# Patient Record
Sex: FEMALE | Race: BLACK OR AFRICAN AMERICAN | NOT HISPANIC OR LATINO | Employment: UNEMPLOYED | URBAN - METROPOLITAN AREA
[De-identification: names, ages, dates, MRNs, and addresses within clinical notes are randomized per-mention and may not be internally consistent; named-entity substitution may affect disease eponyms.]

---

## 2017-04-27 ENCOUNTER — OFFICE VISIT - HEALTHEAST (OUTPATIENT)
Dept: FAMILY MEDICINE | Facility: CLINIC | Age: 37
End: 2017-04-27

## 2017-04-27 ENCOUNTER — COMMUNICATION - HEALTHEAST (OUTPATIENT)
Dept: FAMILY MEDICINE | Facility: CLINIC | Age: 37
End: 2017-04-27

## 2017-04-27 DIAGNOSIS — F17.200 NICOTINE DEPENDENCE: ICD-10-CM

## 2017-04-27 DIAGNOSIS — Z11.3 SCREEN FOR STD (SEXUALLY TRANSMITTED DISEASE): ICD-10-CM

## 2017-04-27 DIAGNOSIS — F41.9 ANXIETY: ICD-10-CM

## 2017-04-27 DIAGNOSIS — N92.6 IRREGULAR MENSES: ICD-10-CM

## 2017-04-27 DIAGNOSIS — G47.30 SLEEP APNEA: ICD-10-CM

## 2017-04-27 DIAGNOSIS — J45.20 INTERMITTENT ASTHMA: ICD-10-CM

## 2017-04-27 DIAGNOSIS — E66.811 OBESITY (BMI 30.0-34.9): ICD-10-CM

## 2017-04-27 DIAGNOSIS — Z00.00 ANNUAL PHYSICAL EXAM: ICD-10-CM

## 2017-04-27 DIAGNOSIS — F32.9 MAJOR DEPRESSION: ICD-10-CM

## 2017-04-27 DIAGNOSIS — F12.90 MARIJUANA USE: ICD-10-CM

## 2017-04-27 LAB
HBA1C MFR BLD: 5.4 % (ref 3.5–6)
HIV 1+2 AB+HIV1 P24 AG SERPL QL IA: NEGATIVE
LDLC SERPL CALC-MCNC: 121 MG/DL

## 2017-04-27 ASSESSMENT — MIFFLIN-ST. JEOR: SCORE: 1481.09

## 2017-04-28 LAB — SYPHILIS RPR SCREEN - HISTORICAL: NORMAL

## 2017-05-02 LAB
HPV INTERPRETATION - HISTORICAL: NORMAL
HPV INTERPRETER - HISTORICAL: NORMAL

## 2017-05-04 LAB
BKR LAB AP ABNORMAL BLEEDING: YES
BKR LAB AP BIRTH CONTROL/HORMONES: NORMAL
BKR LAB AP CERVICAL APPEARANCE: NORMAL
BKR LAB AP GYN ADEQUACY: NORMAL
BKR LAB AP GYN INTERPRETATION: NORMAL
BKR LAB AP HPV REFLEX: NORMAL
BKR LAB AP LMP: NORMAL
BKR LAB AP PATIENT STATUS: NORMAL
BKR LAB AP PREVIOUS ABNORMAL: NORMAL
BKR LAB AP PREVIOUS NORMAL: NORMAL
HIGH RISK?: NO
PATH REPORT.COMMENTS IMP SPEC: NORMAL
RESULT FLAG (HE HISTORICAL CONVERSION): NORMAL

## 2017-05-23 ENCOUNTER — RECORDS - HEALTHEAST (OUTPATIENT)
Dept: ADMINISTRATIVE | Facility: OTHER | Age: 37
End: 2017-05-23

## 2017-06-07 ENCOUNTER — RECORDS - HEALTHEAST (OUTPATIENT)
Dept: ADMINISTRATIVE | Facility: OTHER | Age: 37
End: 2017-06-07

## 2017-06-13 ENCOUNTER — RECORDS - HEALTHEAST (OUTPATIENT)
Dept: ADMINISTRATIVE | Facility: OTHER | Age: 37
End: 2017-06-13

## 2017-06-14 ENCOUNTER — RECORDS - HEALTHEAST (OUTPATIENT)
Dept: ADMINISTRATIVE | Facility: OTHER | Age: 37
End: 2017-06-14

## 2017-06-15 ENCOUNTER — OFFICE VISIT - HEALTHEAST (OUTPATIENT)
Dept: FAMILY MEDICINE | Facility: CLINIC | Age: 37
End: 2017-06-15

## 2017-06-15 DIAGNOSIS — R10.2 PELVIC PAIN: ICD-10-CM

## 2017-06-15 DIAGNOSIS — N92.6 ABNORMAL MENSES: ICD-10-CM

## 2017-06-15 ASSESSMENT — MIFFLIN-ST. JEOR: SCORE: 1453.33

## 2017-06-20 ENCOUNTER — RECORDS - HEALTHEAST (OUTPATIENT)
Dept: ADMINISTRATIVE | Facility: OTHER | Age: 37
End: 2017-06-20

## 2017-06-21 ENCOUNTER — RECORDS - HEALTHEAST (OUTPATIENT)
Dept: ADMINISTRATIVE | Facility: OTHER | Age: 37
End: 2017-06-21

## 2017-06-22 ENCOUNTER — COMMUNICATION - HEALTHEAST (OUTPATIENT)
Dept: FAMILY MEDICINE | Facility: CLINIC | Age: 37
End: 2017-06-22

## 2017-06-22 ENCOUNTER — AMBULATORY - HEALTHEAST (OUTPATIENT)
Dept: FAMILY MEDICINE | Facility: CLINIC | Age: 37
End: 2017-06-22

## 2017-06-22 ENCOUNTER — HOSPITAL ENCOUNTER (OUTPATIENT)
Dept: ULTRASOUND IMAGING | Facility: CLINIC | Age: 37
Discharge: HOME | End: 2017-06-22
Attending: FAMILY MEDICINE

## 2017-06-22 DIAGNOSIS — R10.2 PELVIC PAIN: ICD-10-CM

## 2017-06-27 ENCOUNTER — RECORDS - HEALTHEAST (OUTPATIENT)
Dept: ADMINISTRATIVE | Facility: OTHER | Age: 37
End: 2017-06-27

## 2017-06-30 ENCOUNTER — HOSPITAL ENCOUNTER (OUTPATIENT)
Dept: RADIOLOGY | Facility: CLINIC | Age: 37
Discharge: HOME | End: 2017-06-30
Attending: FAMILY MEDICINE

## 2017-06-30 DIAGNOSIS — R10.2 PELVIC PAIN: ICD-10-CM

## 2017-07-06 ENCOUNTER — COMMUNICATION - HEALTHEAST (OUTPATIENT)
Dept: FAMILY MEDICINE | Facility: CLINIC | Age: 37
End: 2017-07-06

## 2017-07-06 DIAGNOSIS — Z98.51 S/P TUBAL LIGATION: ICD-10-CM

## 2017-07-06 DIAGNOSIS — Z31.9 PATIENT DESIRES PREGNANCY: ICD-10-CM

## 2017-07-07 ENCOUNTER — RECORDS - HEALTHEAST (OUTPATIENT)
Dept: ADMINISTRATIVE | Facility: OTHER | Age: 37
End: 2017-07-07

## 2017-08-12 ENCOUNTER — RECORDS - HEALTHEAST (OUTPATIENT)
Dept: ADMINISTRATIVE | Facility: OTHER | Age: 37
End: 2017-08-12

## 2017-08-15 ENCOUNTER — RECORDS - HEALTHEAST (OUTPATIENT)
Dept: ADMINISTRATIVE | Facility: OTHER | Age: 37
End: 2017-08-15

## 2017-08-21 ENCOUNTER — RECORDS - HEALTHEAST (OUTPATIENT)
Dept: ADMINISTRATIVE | Facility: OTHER | Age: 37
End: 2017-08-21

## 2017-08-23 ENCOUNTER — RECORDS - HEALTHEAST (OUTPATIENT)
Dept: ADMINISTRATIVE | Facility: OTHER | Age: 37
End: 2017-08-23

## 2017-09-01 ENCOUNTER — RECORDS - HEALTHEAST (OUTPATIENT)
Dept: ADMINISTRATIVE | Facility: OTHER | Age: 37
End: 2017-09-01

## 2017-09-06 ENCOUNTER — RECORDS - HEALTHEAST (OUTPATIENT)
Dept: ADMINISTRATIVE | Facility: OTHER | Age: 37
End: 2017-09-06

## 2017-09-07 ENCOUNTER — COMMUNICATION - HEALTHEAST (OUTPATIENT)
Dept: FAMILY MEDICINE | Facility: CLINIC | Age: 37
End: 2017-09-07

## 2017-09-14 ENCOUNTER — RECORDS - HEALTHEAST (OUTPATIENT)
Dept: ADMINISTRATIVE | Facility: OTHER | Age: 37
End: 2017-09-14

## 2017-09-15 ENCOUNTER — RECORDS - HEALTHEAST (OUTPATIENT)
Dept: ADMINISTRATIVE | Facility: OTHER | Age: 37
End: 2017-09-15

## 2017-09-18 ENCOUNTER — COMMUNICATION - HEALTHEAST (OUTPATIENT)
Dept: FAMILY MEDICINE | Facility: CLINIC | Age: 37
End: 2017-09-18

## 2017-09-21 ENCOUNTER — RECORDS - HEALTHEAST (OUTPATIENT)
Dept: ADMINISTRATIVE | Facility: OTHER | Age: 37
End: 2017-09-21

## 2017-09-21 ENCOUNTER — OFFICE VISIT - HEALTHEAST (OUTPATIENT)
Dept: FAMILY MEDICINE | Facility: CLINIC | Age: 37
End: 2017-09-21

## 2017-09-21 ENCOUNTER — RECORDS - HEALTHEAST (OUTPATIENT)
Dept: GENERAL RADIOLOGY | Facility: CLINIC | Age: 37
End: 2017-09-21

## 2017-09-21 DIAGNOSIS — J45.20 INTERMITTENT ASTHMA, UNCOMPLICATED: ICD-10-CM

## 2017-09-21 DIAGNOSIS — M79.672 LEFT FOOT PAIN: ICD-10-CM

## 2017-09-21 DIAGNOSIS — F41.9 ANXIETY: ICD-10-CM

## 2017-09-21 DIAGNOSIS — M79.672 PAIN IN LEFT FOOT: ICD-10-CM

## 2017-09-21 DIAGNOSIS — F32.9 MAJOR DEPRESSION: ICD-10-CM

## 2017-09-21 RX ORDER — ALBUTEROL SULFATE 90 UG/1
2 AEROSOL, METERED RESPIRATORY (INHALATION) EVERY 4 HOURS PRN
Qty: 1 INHALER | Refills: 1 | Status: SHIPPED | OUTPATIENT
Start: 2017-09-21

## 2017-09-22 ENCOUNTER — COMMUNICATION - HEALTHEAST (OUTPATIENT)
Dept: FAMILY MEDICINE | Facility: CLINIC | Age: 37
End: 2017-09-22

## 2017-09-26 ENCOUNTER — COMMUNICATION - HEALTHEAST (OUTPATIENT)
Dept: FAMILY MEDICINE | Facility: CLINIC | Age: 37
End: 2017-09-26

## 2017-09-26 DIAGNOSIS — M79.673 PAIN OF FOOT, UNSPECIFIED LATERALITY: ICD-10-CM

## 2017-09-28 ENCOUNTER — COMMUNICATION - HEALTHEAST (OUTPATIENT)
Dept: NURSING | Facility: CLINIC | Age: 37
End: 2017-09-28

## 2017-09-28 ENCOUNTER — OFFICE VISIT - HEALTHEAST (OUTPATIENT)
Dept: FAMILY MEDICINE | Facility: CLINIC | Age: 37
End: 2017-09-28

## 2017-09-28 DIAGNOSIS — M79.672 LEFT FOOT PAIN: ICD-10-CM

## 2017-09-28 DIAGNOSIS — Z71.89 COUNSELING AND COORDINATION OF CARE: ICD-10-CM

## 2017-10-09 ENCOUNTER — HOSPITAL ENCOUNTER (OUTPATIENT)
Dept: MRI IMAGING | Facility: CLINIC | Age: 37
Discharge: HOME | End: 2017-10-09
Attending: FAMILY MEDICINE

## 2017-10-09 DIAGNOSIS — M79.672 LEFT FOOT PAIN: ICD-10-CM

## 2017-10-11 ENCOUNTER — COMMUNICATION - HEALTHEAST (OUTPATIENT)
Dept: NURSING | Facility: CLINIC | Age: 37
End: 2017-10-11

## 2017-10-11 ENCOUNTER — COMMUNICATION - HEALTHEAST (OUTPATIENT)
Dept: CARE COORDINATION | Facility: CLINIC | Age: 37
End: 2017-10-11

## 2017-10-12 ENCOUNTER — COMMUNICATION - HEALTHEAST (OUTPATIENT)
Dept: FAMILY MEDICINE | Facility: CLINIC | Age: 37
End: 2017-10-12

## 2017-10-12 DIAGNOSIS — M79.672 LEFT FOOT PAIN: ICD-10-CM

## 2017-10-17 ENCOUNTER — COMMUNICATION - HEALTHEAST (OUTPATIENT)
Dept: CARE COORDINATION | Facility: CLINIC | Age: 37
End: 2017-10-17

## 2017-10-19 ENCOUNTER — COMMUNICATION - HEALTHEAST (OUTPATIENT)
Dept: FAMILY MEDICINE | Facility: CLINIC | Age: 37
End: 2017-10-19

## 2017-10-19 DIAGNOSIS — F41.9 ANXIETY: ICD-10-CM

## 2017-10-19 DIAGNOSIS — F32.9 MAJOR DEPRESSION: ICD-10-CM

## 2017-10-23 ENCOUNTER — COMMUNICATION - HEALTHEAST (OUTPATIENT)
Dept: NURSING | Facility: CLINIC | Age: 37
End: 2017-10-23

## 2017-10-31 ENCOUNTER — COMMUNICATION - HEALTHEAST (OUTPATIENT)
Dept: FAMILY MEDICINE | Facility: CLINIC | Age: 37
End: 2017-10-31

## 2017-10-31 ENCOUNTER — COMMUNICATION - HEALTHEAST (OUTPATIENT)
Dept: CARE COORDINATION | Facility: CLINIC | Age: 37
End: 2017-10-31

## 2017-10-31 DIAGNOSIS — F41.9 ANXIETY: ICD-10-CM

## 2017-11-10 ENCOUNTER — COMMUNICATION - HEALTHEAST (OUTPATIENT)
Dept: NURSING | Facility: CLINIC | Age: 37
End: 2017-11-10

## 2017-11-16 ENCOUNTER — COMMUNICATION - HEALTHEAST (OUTPATIENT)
Dept: NURSING | Facility: CLINIC | Age: 37
End: 2017-11-16

## 2017-11-22 ENCOUNTER — COMMUNICATION - HEALTHEAST (OUTPATIENT)
Dept: NURSING | Facility: CLINIC | Age: 37
End: 2017-11-22

## 2018-02-28 ENCOUNTER — COMMUNICATION - HEALTHEAST (OUTPATIENT)
Dept: FAMILY MEDICINE | Facility: CLINIC | Age: 38
End: 2018-02-28

## 2018-03-05 ENCOUNTER — RECORDS - HEALTHEAST (OUTPATIENT)
Dept: GENERAL RADIOLOGY | Facility: CLINIC | Age: 38
End: 2018-03-05

## 2018-03-05 ENCOUNTER — COMMUNICATION - HEALTHEAST (OUTPATIENT)
Dept: NURSING | Facility: CLINIC | Age: 38
End: 2018-03-05

## 2018-03-05 ENCOUNTER — OFFICE VISIT - HEALTHEAST (OUTPATIENT)
Dept: FAMILY MEDICINE | Facility: CLINIC | Age: 38
End: 2018-03-05

## 2018-03-05 DIAGNOSIS — F17.200 NICOTINE DEPENDENCE: ICD-10-CM

## 2018-03-05 DIAGNOSIS — M79.672 LEFT FOOT PAIN: ICD-10-CM

## 2018-03-05 DIAGNOSIS — J45.20 MILD INTERMITTENT ASTHMA WITHOUT COMPLICATION: ICD-10-CM

## 2018-03-05 DIAGNOSIS — R35.89 POLYURIA: ICD-10-CM

## 2018-03-05 DIAGNOSIS — F31.32 BIPOLAR AFFECTIVE DISORDER, CURRENTLY DEPRESSED, MODERATE (H): ICD-10-CM

## 2018-03-05 DIAGNOSIS — H40.9 GLAUCOMA: ICD-10-CM

## 2018-03-05 DIAGNOSIS — S49.91XA ARM INJURY, RIGHT, INITIAL ENCOUNTER: ICD-10-CM
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
## 2018-03-05 DIAGNOSIS — F41.9 ANXIETY: ICD-10-CM

## 2018-03-05 DIAGNOSIS — K59.04 CHRONIC IDIOPATHIC CONSTIPATION: ICD-10-CM

## 2018-03-05 DIAGNOSIS — F32.9 MAJOR DEPRESSION: ICD-10-CM
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
## 2018-03-05 DIAGNOSIS — S59.901A UNSPECIFIED INJURY OF RIGHT ELBOW, INITIAL ENCOUNTER: ICD-10-CM

## 2018-03-05 LAB
ALBUMIN UR-MCNC: NEGATIVE MG/DL
APPEARANCE UR: CLEAR
BACTERIA #/AREA URNS HPF: ABNORMAL HPF
BILIRUB UR QL STRIP: NEGATIVE
COLOR UR AUTO: YELLOW
GLUCOSE UR STRIP-MCNC: NEGATIVE MG/DL
HGB UR QL STRIP: ABNORMAL
KETONES UR STRIP-MCNC: NEGATIVE MG/DL
LEUKOCYTE ESTERASE UR QL STRIP: NEGATIVE
NITRATE UR QL: NEGATIVE
PH UR STRIP: 7.5 [PH] (ref 5–8)
RBC #/AREA URNS AUTO: ABNORMAL HPF
SP GR UR STRIP: 1.02 (ref 1–1.03)
SQUAMOUS #/AREA URNS AUTO: ABNORMAL LPF
UROBILINOGEN UR STRIP-ACNC: ABNORMAL
WBC #/AREA URNS AUTO: ABNORMAL HPF

## 2018-03-05 RX ORDER — RISPERIDONE 1 MG/1
TABLET ORAL
Qty: 45 TABLET | Refills: 0 | Status: SHIPPED | OUTPATIENT
Start: 2018-03-05

## 2018-03-05 RX ORDER — RISPERIDONE 0.25 MG/1
TABLET ORAL
Qty: 30 TABLET | Refills: 0 | Status: SHIPPED | OUTPATIENT
Start: 2018-03-05

## 2018-03-05 ASSESSMENT — MIFFLIN-ST. JEOR: SCORE: 1521.37

## 2018-03-06 ENCOUNTER — AMBULATORY - HEALTHEAST (OUTPATIENT)
Dept: NURSING | Facility: CLINIC | Age: 38
End: 2018-03-06

## 2018-03-06 ENCOUNTER — COMMUNICATION - HEALTHEAST (OUTPATIENT)
Dept: CARE COORDINATION | Facility: CLINIC | Age: 38
End: 2018-03-06

## 2018-03-07 ENCOUNTER — COMMUNICATION - HEALTHEAST (OUTPATIENT)
Dept: FAMILY MEDICINE | Facility: CLINIC | Age: 38
End: 2018-03-07

## 2018-03-23 ENCOUNTER — COMMUNICATION - HEALTHEAST (OUTPATIENT)
Dept: SCHEDULING | Facility: CLINIC | Age: 38
End: 2018-03-23

## 2018-04-11 ENCOUNTER — OFFICE VISIT - HEALTHEAST (OUTPATIENT)
Dept: BEHAVIORAL HEALTH | Facility: CLINIC | Age: 38
End: 2018-04-11

## 2018-04-11 DIAGNOSIS — F33.2 SEVERE EPISODE OF RECURRENT MAJOR DEPRESSIVE DISORDER, WITHOUT PSYCHOTIC FEATURES (H): ICD-10-CM

## 2018-04-11 DIAGNOSIS — F41.1 GENERALIZED ANXIETY DISORDER: ICD-10-CM

## 2018-04-12 ENCOUNTER — AMBULATORY - HEALTHEAST (OUTPATIENT)
Dept: FAMILY MEDICINE | Facility: CLINIC | Age: 38
End: 2018-04-12

## 2018-04-12 DIAGNOSIS — F31.32 BIPOLAR AFFECTIVE DISORDER, CURRENTLY DEPRESSED, MODERATE (H): ICD-10-CM

## 2018-04-24 ENCOUNTER — COMMUNICATION - HEALTHEAST (OUTPATIENT)
Dept: NURSING | Facility: CLINIC | Age: 38
End: 2018-04-24

## 2018-05-04 ENCOUNTER — COMMUNICATION - HEALTHEAST (OUTPATIENT)
Dept: NURSING | Facility: CLINIC | Age: 38
End: 2018-05-04

## 2018-05-21 ENCOUNTER — COMMUNICATION - HEALTHEAST (OUTPATIENT)
Dept: BEHAVIORAL HEALTH | Facility: CLINIC | Age: 38
End: 2018-05-21

## 2018-09-04 ENCOUNTER — COMMUNICATION - HEALTHEAST (OUTPATIENT)
Dept: PHARMACY | Facility: CLINIC | Age: 38
End: 2018-09-04

## 2021-05-30 VITALS — WEIGHT: 189.12 LBS | BODY MASS INDEX: 34.8 KG/M2 | HEIGHT: 62 IN

## 2021-05-31 VITALS — HEIGHT: 62 IN | BODY MASS INDEX: 33.68 KG/M2 | WEIGHT: 183 LBS

## 2021-05-31 VITALS — WEIGHT: 182 LBS | BODY MASS INDEX: 33.29 KG/M2

## 2021-05-31 VITALS — BODY MASS INDEX: 34.93 KG/M2 | WEIGHT: 191 LBS

## 2021-06-01 VITALS — BODY MASS INDEX: 36.44 KG/M2 | WEIGHT: 198 LBS | HEIGHT: 62 IN

## 2021-06-10 NOTE — PROGRESS NOTES
"  Subjective:     Niharika Baird is a 36 y.o. female who presents for an annual exam.     Other concerns today:  1. Mental Health.  She says that she has anxiety and depression.  She denies any other mental health diagnoses.  She is taking citalopram and Risperdal.  She says that she has been out of these meds for \"a long time.\"  She later states she has been out of them for at least a year.  She has had occasional refills when she has been in to the emergency room, but nothing consistent.  She notes that she is \"more angry\" since she has been off of her medicines.  Her sleep is poor.  Her appetite is poor.  She states when she is taking these medicines regularly, they work well to control her symptoms.  She has a history of being hospitalized in the psychiatric unit at [facility] in 2014.  She denies any past suicide attempts.  She lives with her PCA Madison Blankenship [phone]    She is here to establish care.  She is here with her care coordinator.  She has not really been in to see any medical providers for the past year.  Prior to that she was seen primarily at [facility], [facility], [facility].  She smokes about a pack of cigarettes a week.  She also uses marijuana.  She denies any alcohol or drug use.    She is not using anything for birth control.  She is 36 and a smoker.  I reviewed with her that the safest options for birth control would be condoms, Depo-Provera, Nexplanon, or IUD.  Patient does not want to try Depo because it \"made her sick\" in the past.  She said birth control pills made her \"feel funny.\"  She does not like the idea of putting anything in her body, so she does not want to try Nexplanon or an IUD.    She says that her feet \"feel weird\" and is wondering if she has diabetes.  She has some mid to low back pain, sometimes sharp.  She has had a few episodes recently of stress incontinence.  No other change in urinary or bowel habits.  She reports she has a history of asthma.  It " sounds like she is using her inhaler frequently.  She does not give a good history of how severe her asthma has been before.  She says she has sleep apnea, but someone stole her sleep apnea machine.  She reports past history of a heart murmur.    Immunization History   Administered Date(s) Administered     DT (pediatric) 05/22/1996     DTP 05/01/1981, 03/01/1982, 06/01/1983, 10/01/1984, 10/01/1985     Hep B, Adult 04/29/1997, 07/31/1997, 10/28/1997     Influenza, inj, historic 10/28/1997     MMR 09/01/1985, 11/03/1993     OPV 06/01/1983, 10/01/1984, 10/01/1985     Td, adult adsorbed, PF 05/20/2007     Tdap 04/27/2017     Gynecologic History  Patient's last menstrual period was 04/12/2017.  Contraception: none  Last Pap: several yrs ago?  Last mammogram: n/a      Current Outpatient Prescriptions:      albuterol (PROAIR HFA;PROVENTIL HFA;VENTOLIN HFA) 90 mcg/actuation inhaler, Inhale 2 puffs every 4 (four) hours as needed for wheezing or shortness of breath., Disp: 1 Inhaler, Rfl: 1     citalopram (CELEXA) 20 MG tablet, Take 20 mg by mouth., Disp: , Rfl:      risperiDONE (RISPERDAL) 0.25 MG tablet, Take 0.25 mg by mouth., Disp: , Rfl:      risperiDONE (RISPERDAL) 1 MG tablet, Take 1.5 mg by mouth., Disp: , Rfl:      traMADol (ULTRAM) 50 mg tablet, Take 1 tab every 4 hours as needed for pain, Disp: , Rfl:   No past medical history on file.  No past surgical history on file.  Esomeprazole magnesium; Penicillins; Ibuprofen; Lansoprazole; and Omeprazole  No family history on file.  Social History     Social History     Marital status: Single     Spouse name: N/A     Number of children: N/A     Years of education: N/A     Occupational History     Not on file.     Social History Main Topics     Smoking status: Current Some Day Smoker     Smokeless tobacco: Not on file     Alcohol use Not on file     Drug use: Not on file     Sexual activity: Not on file     Other Topics Concern     Not on file     Social History Narrative      No narrative on file     Review of Systems  Complete Review of Systems is discussed with patient and is negative except as noted in HPI.    Objective:     Vitals:    04/27/17 1101   BP: 90/58   Pulse: 94   Resp: 18   Temp: 98.3  F (36.8  C)     Body mass index is 34.59 kg/(m^2).    Physical Exam:  General: Patient is alert and Oriented x 3, in no apparent distress.  Appropriately groomed, wearing sunglasses during most of the visit  HEENT, Thyroid, Lymphatic, Cardiac, Pulmonary, GI, Musculoskeletal, and Neuro exams were completed today and grossly normal.  Breast Exam: No lumps, skin changes, lymphadenopathy, or nipple discharge noted bilaterally.  Genitourinary Exam: External genitalia is normal in appearance, vaginal walls are healthy and without lesions, no significant discharge noted in the vaginal vault, cervix is well visualized and normal in appearance.  Pap was taken without difficulty.    Results for orders placed or performed in visit on 04/27/17   Wet Prep, Vaginal   Result Value Ref Range    Yeast Result No yeast seen No yeast seen    Trichomonas No Trichomonas seen No Trichomonas seen    Clue Cells, Wet Prep No Clue cells seen No Clue cells seen   Glycosylated Hemoglobin A1c   Result Value Ref Range    Hemoglobin A1c 5.4 3.5 - 6.0 %   HM1 (CBC with Diff)   Result Value Ref Range    WBC 5.9 4.0 - 11.0 thou/uL    RBC 3.86 3.80 - 5.40 mill/uL    Hemoglobin 11.5 (L) 12.0 - 16.0 g/dL    Hematocrit 33.9 (L) 35.0 - 47.0 %    MCV 88 80 - 100 fL    MCH 29.7 27.0 - 34.0 pg    MCHC 33.8 32.0 - 36.0 g/dL    RDW 12.4 11.0 - 14.5 %    Platelets 258 140 - 440 thou/uL    MPV 8.2 7.0 - 10.0 fL    Neutrophils % 32 (L) 50 - 70 %    Lymphocytes % 59 (H) 20 - 40 %    Monocytes % 6 2 - 10 %    Eosinophils % 3 0 - 6 %    Basophils % 1 0 - 2 %    Neutrophils Absolute 1.9 (L) 2.0 - 7.7 thou/uL    Lymphocytes Absolute 3.5 0.8 - 4.4 thou/uL    Monocytes Absolute 0.3 0.0 - 0.9 thou/uL    Eosinophils Absolute 0.2 0.0 - 0.4  thou/uL    Basophils Absolute 0.0 0.0 - 0.2 thou/uL   Pregnancy (Beta-hCG, Qual), Urine   Result Value Ref Range    Pregnancy Test, Urine Negative Negative    Specific Gravity, UA 1.020 1.001 - 1.030    other labs pending.    Assessment and Plan:     1. Physical Exam.  Health Maintenance discussed with patient as appropriate for age and risk factors.  Screening Pap completed today.  Tdap given today.    2.  Contraception management.  We reviewed birth control options, Depo, IUD, Nexplanon.  She declines all of these.  I reviewed condom use.  It sounds like she had a failed tubal ligation, does not want to get that repaired.  Pregnancy test negative today.    3.  STD screen.  Patient will be informed of results when available.    4.  Nicotine dependence.  She is smoking a pack a week.  She is in the pre-contemplative phase for quitting.    5.  Marijuana use.  She uses marijuana.  She denies any significant alcohol use.  No other drug use.    6.  Depression and anxiety.  She declines any other mental health disorders/diagnoses.  I would like to review her records from [facility] and [facility].    If everything seems in order, I will refill her citalopram and risperidone for a short time, until she gets in to see psychiatry.  Patient denies any thoughts of wanting to hurt him/herself or others and does contract for safety.  She does state that she is very angry with a certain ex-partner.  He is not around her currently.  She says that if he does come around her, she might fight him.  I reviewed that she needs to stay safe and not hurt others.    7.  Asthma.  Unclear how severe this is.  I will review records.  For now, I did refill her albuterol inhaler.  Certainly smoking is making things worse.    This dictation uses voice recognition software, which may contain typographical errors.

## 2021-06-11 NOTE — PROGRESS NOTES
[facility] Office Visit    Chief Complaint:  Chief Complaint   Patient presents with     Establish Care     from last visit      tramadol     feel nausea everytime take it         Assessment/Plan:  1. Abnormal menses  Heavy periods and frequent bleeding and painful periods in past 6 months.  Likely related to reported fibroids.  Imaging as below.  Labs as below.  Pt would like to get pregnant if at all possible despite having a tubal so declines OCPs/IUD/nexplanon/depo, etc to regulate .  Consider surgical interventions.    - Pregnancy, Urine    2. Pelvic pain  As above.  Pt wants to know if she can get pregnant despite tuabl ligation and subsequent tubal pregnancy and SAB.  Has been using narcotics for her pelvic pain- will get utox incase strong pain medications are needed in future.    - US Sono Hysterogram; Future  - Chlamydia trachomatis & Neisseria gonorrhoeae, Amplified Detection  - Culture, Urine  - Drug Abuse 1+, Urine    Return if symptoms worsen or fail to improve.  The following high BMI interventions were performed this visit: encouragement to exercise    Patient Education/AVS:  There are no Patient Instructions on file for this visit.    HPI:   Niharika Baird is a 36 y.o. female c/o wanting to transfer care to [facility]- had been going to [facility].      Wanting to do STI testing again.  Having dark urine and a funny smell.  UPT.  Her Fiance would like to try to get pregnant.  She had a tubal ligation 11 years ago.  Has had 2 pregnancies since then- tubal and miscarriage.  LMP started 5/30-6/4 heavy and passing clots after a couple days of spotting.  Was very painful and was sweating.  Used 22 pads in 2 days.  This was 5 days later than expected.  Started spotting on 6/10 and has had daily spotting ever since.  Wondering if she can get pregnant.  Wondering if she has fibroids.  Pain was so bad she was taking 2 tramadol every 4hrs and it still didn't help.  Caused  "Really bad nausea.      Did get set up with mental health provider again- someone came out to house for individual therapy.      History summarized from1-2:SEen by [facility] 4/2017 for CPE and discussed her mental health, tobacco and THC use, and health maintenance  Old Records-1:na  Radiology tests reviewed-1: na  Lab tests reviewed-1: 2017  Medicine tests reviewed-1: na    Physical Exam:  /62 (Patient Site: Left Arm, Patient Position: Sitting, Cuff Size: Adult Large)  Pulse 84  Temp 99.2  F (37.3  C) (Oral)   Resp 20  Ht 5' 2\" (1.575 m)  Wt 183 lb (83 kg)  LMP 05/30/2017 (Exact Date)  BMI 33.47 kg/m2 Body mass index is 33.47 kg/(m^2). Patient's last menstrual period was 05/30/2017 (exact date).  Vital signs reviewed  Wt Readings from Last 3 Encounters:   06/15/17 183 lb (83 kg)   04/27/17 189 lb 1.9 oz (85.8 kg)     History   Smoking Status     Current Some Day Smoker   Smokeless Tobacco     Not on file     History   Sexual Activity     Sexual activity: Not on file     No Data Recorded  PHQ-9 Total Score: 19 (6/15/2017  3:00 PM)  PHQ-2 Total Score: 3 (6/15/2017  3:00 PM)  Depression Follow-up Plan: mental health care assessment (6/15/2017  3:00 PM)  ACT Total Score: 13 (6/15/2017  3:00 PM)    All normal as below except abnormalities include: all normal- pt is here with her niece today and so visit is shorter and restricted with regards to conversation but niece is too young to be left alone in waiting room. Pelvic exam declined.    General is a  36 y.o. female sitting comfortably in no apparent distress.   Neck: Supple without lymphadenopathy or thyromegally  CV: Regular rate and rhythm S1S2 without rubs, murmurs or gallops,   Lungs: Clear to auscultation bilaterally  Abd:  +BS, soft NT/ND,  No masses or organomegally  Extremities: Warm, No Edema, 2+ Pedal and radial pulses bilaterally  Skin: No lesions or rashes noted  Neuro/MSK: Able to ambulate around the exam room with equal movement, strength and " normal coordination of the upper and lower extremeties symmetrically    Results for orders placed or performed in visit on 06/15/17   Chlamydia trachomatis & Neisseria gonorrhoeae, Amplified Detection   Result Value Ref Range    Chlamydia trachomatis, Amplified Detection Negative Negative    Neisseria gonorrhoeae, Amplified Detection Negative Negative   Culture, Urine   Result Value Ref Range    Culture No Growth    Pregnancy, Urine   Result Value Ref Range    Pregnancy Test, Urine Negative Negative    Specific Gravity, UA 1.015 1.001 - 1.030   Drug Abuse 1+, Urine   Result Value Ref Range    Amphetamines Screen Negative Screen Negative    Benzodiazepines Screen Negative Screen Negative    Opiates Screen Negative Screen Negative    Phencyclidine Screen Negative Screen Negative    THC (!) Screen Negative     Screen Positive (Confirmation available on request)    Barbiturates Screen Negative Screen Negative    Cocaine Metabolite Screen Negative Screen Negative    Methadone Screen Negative Screen Negative    Oxycodone Screen Negative Screen Negative    Creatinine, Urine 313.2 mg/dL       ROS:  10 point review of symptoms all negative except as outlined in the HPI above.    Med list and active problem list reviewed and updated as part of this encounter    Current Outpatient Prescriptions on File Prior to Visit   Medication Sig Dispense Refill     albuterol (PROAIR HFA;PROVENTIL HFA;VENTOLIN HFA) 90 mcg/actuation inhaler Inhale 2 puffs every 4 (four) hours as needed for wheezing or shortness of breath. 1 Inhaler 1     citalopram (CELEXA) 20 MG tablet Take 1 tablet (20 mg total) by mouth daily. 30 tablet 3     risperiDONE (RISPERDAL) 0.25 MG tablet Take 1 tablet (0.25 mg total) by mouth every morning. 30 tablet 3     risperiDONE (RISPERDAL) 1 MG tablet Take 1.5 tablets (1.5 mg total) by mouth at bedtime. 45 tablet 3     No current facility-administered medications on file prior to visit.          Winnie Martinez,  MD    This document was created using voice recognition software which may contain typographical errors.

## 2021-06-13 NOTE — PROGRESS NOTES
"Patient missed her RN Assessment that was scheduled for 10/17/17  Completed a conference call with the patient and the [facility] workerCarmen    Patient's ARM worker, Carmen, states she wasn't aware of the appointment    Assisted in rescheduling the RN Assessment for:  10/31/17 at 1:00 with Aye Phoenix RN    Informed them I will be out of the office from 10/25/17 and returning on 11/6/17  Since I will not be in the clinic for the RN Assessment, I helped coordinate the Goal Setting appointment as well    Goal Setting scheduled for:  11/30/17 at 1:00 with Dr. Martinez and Care Guide 3     Patient and Carmen questioned about a pending podiatry appointment  Upon reviewing the patient's appointment list, I found that the appointment had been cancelled  It states it was \"Patient Initiated\"  Carmen and the patient state that is incorrect  Carmen will assist the patient in getting the appointment rescheduled    Pending Appointments:  10/31/17 at 1:00 with Aye Phoenix RN  11/30/17 at 1:00 with Dr. Martinez and Care Guide  _________________________  Possible future goals:  Long term pain management--needs pain assessed first?  Arthritis in foot--management of symptoms?    Need to notify [facility] worker, of appointments as well  Carmen will accompany patient to appointments    Patient is a poor historian per Dr. Martinez  "

## 2021-06-13 NOTE — PROGRESS NOTES
[facility] Office Visit    Chief Complaint:  Chief Complaint   Patient presents with     follow up         Assessment/Plan:  1. Left foot pain  Unclear etiology.  Bone spur seen on xray but pain seems out of porpotion to exam findings.  Consider RSD?  F/u with MRI and podiatry evaluation.  Minimal use of percocet rx today- did check pharmacy data base and pt has not been getting narcotics on regular basis from multiple sources.  Enroll in [facility] for better care coordination with her mental health team.    - MR Foot Without Contrast Left; Future  - oxyCODONE-acetaminophen (PERCOCET) 5-325 mg per tablet; 1/2 to 1 tab daily to help with pain  Dispense: 12 tablet; Refill: 0      Patient Education/AVS:  There are no Patient Instructions on file for this visit.    HPI:   iNharika Baird is a 37 y.o. female c/o talk about getting in to [facility].  Here with [employer] worker today.  Still having a lot of pain.  Chronic pain in left foot and knee.  Took one of her aunt's pain pills and got good relief for 6 hours.  Pt does smoke pot when she is at home to help with pain.  Here with her [employer] worker today and did sign SUSANA so that [facility] care guide can help coordinating care with her mental health team.      ROS:  Constitutional, CV, Resp, GI, , MSK, skin, neuro, psych all negative except as outlined in the HPI above.    Radiology tests reviewed-1: xray report from last week reviewed with pt today:  FINDINGS: No fracture or dislocation. Mild degenerative change in the interphalangeal joints, first MTP joint, and in the midfoot. Spurring is seen at the Achilles insertion and plantar aspect of the calcaneus.  Lab tests reviewed-1: labs from last week reviewed with pt- WNL    Physical Exam:  /70 (Patient Site: Left Arm, Patient Position: Sitting, Cuff Size: Adult Large)  Pulse 64  Wt 191 lb (86.6 kg)  LMP  (Approximate) Comment: Last day of LMP 9/5/2017  Breastfeeding? No  BMI 34.93 kg/m2 Body mass index is 34.93  kg/(m^2). No LMP recorded (approximate).  Vital signs reviewed  Wt Readings from Last 3 Encounters:   09/28/17 191 lb (86.6 kg)   09/21/17 182 lb (82.6 kg)   06/15/17 183 lb (83 kg)     History   Smoking Status     Current Some Day Smoker   Smokeless Tobacco     Not on file     History   Sexual Activity     Sexual activity: Not on file     No Data Recorded  PHQ-9 Total Score: 19 (6/15/2017  3:00 PM)  PHQ-2 Total Score: 3 (6/15/2017  3:00 PM)  Depression Follow-up Plan: mental health care assessment (6/15/2017  3:00 PM)  ACT Total Score: 13 (6/15/2017  3:00 PM)    All normal as below except abnormalities include: pt does walk fairly comfortably with a slight limp favoring her left foot.  Becomes teary and agitated when talking about her foot/left leg pain.  Very inconsistent and circumferential history- pt talks about having severe leg pain for years and also that her pain has been bad for anywhere from a few weeks to a few months.  Reports she has seen multiple providers for her pain and no one is taking her seriously but not a lot seen on [facility] to indicate she has sought medical care as explained. Foot is very sensitive/painful to light touch and patient will not let me do an in depth examination due to the pain.  No obvious redness, warmth or swelling.  Not cool to touch.    General is a  37 y.o. female sitting comfortably in no apparent distress.     Results for orders placed or performed in visit on 09/21/17   Uric Acid   Result Value Ref Range    Uric Acid 5.4 2.0 - 7.5 mg/dL   Vitamin B12   Result Value Ref Range    Vitamin B-12 592 213 - 816 pg/mL   Thyroid Cascade   Result Value Ref Range    TSH 1.62 0.30 - 5.00 uIU/mL   C-Reactive Protein   Result Value Ref Range    CRP 1.9 (H) 0.0 - 0.8 mg/dL   HM1 (CBC with Diff)   Result Value Ref Range    WBC 6.0 4.0 - 11.0 thou/uL    RBC 3.72 (L) 3.80 - 5.40 mill/uL    Hemoglobin 10.9 (L) 12.0 - 16.0 g/dL    Hematocrit 34.2 (L) 35.0 - 47.0 %    MCV 92 80 - 100  fL    MCH 29.3 27.0 - 34.0 pg    MCHC 31.9 (L) 32.0 - 36.0 g/dL    RDW 11.9 11.0 - 14.5 %    Platelets 261 140 - 440 thou/uL    MPV 8.2 7.0 - 10.0 fL    Neutrophils % 35 (L) 50 - 70 %    Lymphocytes % 55 (H) 20 - 40 %    Monocytes % 7 2 - 10 %    Eosinophils % 3 0 - 6 %    Basophils % 0 0 - 2 %    Neutrophils Absolute 2.1 2.0 - 7.7 thou/uL    Lymphocytes Absolute 3.3 0.8 - 4.4 thou/uL    Monocytes Absolute 0.4 0.0 - 0.9 thou/uL    Eosinophils Absolute 0.2 0.0 - 0.4 thou/uL    Basophils Absolute 0.0 0.0 - 0.2 thou/uL       Med list and active problem list reviewed and updated as part of this encounter    Current Outpatient Prescriptions on File Prior to Visit   Medication Sig Dispense Refill     albuterol (PROAIR HFA;PROVENTIL HFA;VENTOLIN HFA) 90 mcg/actuation inhaler Inhale 2 puffs every 4 (four) hours as needed for wheezing or shortness of breath. 1 Inhaler 1     citalopram (CELEXA) 20 MG tablet Take 1 tablet (20 mg total) by mouth daily. 30 tablet 0     risperiDONE (RISPERDAL) 0.25 MG tablet Take 1 tablet (0.25 mg total) by mouth every morning. 30 tablet 0     risperiDONE (RISPERDAL) 1 MG tablet Take 1.5 tablets (1.5 mg total) by mouth at bedtime. 45 tablet 0     No current facility-administered medications on file prior to visit.          Winnie Martinez MD    This document was created using voice recognition software which may contain typographical errors.

## 2021-06-13 NOTE — PROGRESS NOTES
Patient left a message on my voicemail at 12:55 today stating she thinks she has the flu and cannot make it to her appointment today.    Returned the patient's call and assisted in rescheduling the RN Assessment for:  10/17/17 at 2:30 with Aye Phoenix RN                     3:00 with me to schedule the Goal Setting appointment    I also called her [facility] Worker, Carmen, and notified her of the appointment  She stated she would call me back if it didn't work for her schedule    Pending Appointments:  10/17/17 at 2:30 with Aye Phoenix RN                     3:00 with [facility] Care Guide  10/26/17 at 2:00 with Dr. Oneill at [facility]  _________________________  Possible future goals:  Long term pain management--needs pain assessed first?  Arthritis in foot--management of symptoms?    Need to notify Carmen, [facility] worker, of appointments as well  Carmen will accompany patient to appointments    Patient is a poor historian per Dr. Martinez
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
## 2021-06-13 NOTE — PROGRESS NOTES
Accompanied by: SUKH Doss Worker from [facility]    The Clinic Care Guide met with the patient in clinic today at the request of the PCP to discuss possible Clinic Care Coordination enrollment. Clinic Care Coordination was described to the patient and immediate needs were discussed. The patient agreed to enrollment and a future appointment was scheduled for an RN Care Coordination Assessment. The patient was provided with an informational packet describing Clinic Care Coordination and given contact information for the Clinic Care Guide.    RN Care Coordination Assessment (PCAM) Appointment Date: 10/11/17 at 1:00 with Aye Phoenix RN  Care Guide scheduled at 1:30 same day to schedule the Goal Setting appointment    Immediate Needs: None    Possible future goals:  Long term pain management--needs pain assessed first?  Arthritis in foot--management of symptoms?    Need to notify SUKH Morales worker, of appointments as well  Carmen will accompany patient to appointments    Patient is a poor historian per Dr. Martinez    SUSANA obtained for:  [facility] ([facility])    Pending Appointments:  10/11/17 at 1:00 with Aye Phoenix RN                     1:30 with Yoni Mendez Care Guide  10/26/17 at 2:00 with Dr. Oneill at [facility]

## 2021-06-14 NOTE — PROGRESS NOTES
Scheduled Follow Up Call: Attempt 3  Care Guide called and left a message for the patient.  If the patient is returning my call, please transfer her to me, Vanessa Natarajan, at [phone].    Explained in my message to the patient that I have made 3 attempts at getting her intake appointment rescheduled  At this time I will stop contact  I have also cancelled the Goal Setting appointment that was scheduled for 11/30/17 because the intake appointment has to occur first    If she does still want to enroll, she can contact me at [phone].    RN Assessment scheduling history:  10/11/17 at 1:00--Patient called and rescheduled  10/17/17 at 2:30--[facility]  10/31/17 at 1:00--[facility]    I also called her [employer] Worker, Carmen  Left a detailed message stating the above information  Also stated I am transferring clinics and my replacement's name is Gary Vega is taking over my direct number [phone]  If the patient still wants to enroll, they can call Gary and get the intake rescheduled  Patient only has 1 more chance to complete the intake because she has already no showed 2 times    I have updated the Clinic Care Coordination status to unreachable for enrollment  I have removed the CCC Potential Patient modifier    Pending Appointments:  None  _________________________  Possible future goals:  Long term pain management--needs pain assessed first?  Arthritis in foot--management of symptoms?    Need to notify Carmen, SUKH worker, of appointments as well  Carmen will accompany patient to appointments    Patient is a poor historian per Dr. Martinez

## 2021-06-14 NOTE — PROGRESS NOTES
RN Assessment scheduling history:  10/11/17 at 1:00--Patient called and rescheduled  10/17/17 at 2:30--[facility]  10/31/17 at 1:00--[facility]    Called patient to discuss getting the RN Assessment rescheduled  Unclear what the barriers were for making it to the appointment on 10/31/17, patient was talking in circles and it didn't make sense  Explained we can only try rescheduling 1 more time, it she no shows the appointment again we cannot enroll her in Clinic Care Coordination    Patient currently in [place] until next week  Will be back in town around Wednesday or Thursday  Will connect on Thursday, 11/16 and complete a conference call with [facility] worker, Carmen, in order to coordinate an appointment    Plan:  Call patient 11/16 and do a conference call with patient and [facility] worker  May need to reschedule the Goal Setting appointment if patient doesn't complete the RN Assessment first    Pending Appointments:  11/30/17 at 1:00 with Dr. Martinez and Care Guide for Goal Setting appointment  _________________________  Possible future goals:  Long term pain management--needs pain assessed first?  Arthritis in foot--management of symptoms?    Need to notify SUKH Morales worker, of appointments as well  Carmen will accompany patient to appointments    Patient is a poor historian per Dr. Martinez

## 2021-06-14 NOTE — PROGRESS NOTES
Scheduled Follow Up Call: Attempt 2  Care Guide called the patient to try and get the RN Assessment rescheduled.  The person who answered the phone stated she wasn't available, I left a message asking for her to return my call.  If the patient is returning my call, please transfer her to me, Vanessa Natarajan, at [phone].    RN Assessment scheduling history:  10/11/17 at 1:00--Patient called and rescheduled  10/17/17 at 2:30--[facility]  10/31/17 at 1:00--[facility]    Plan:  Call patient and do a conference call with patient and [facility] worker in order to schedule the RN Assessment  May need to reschedule the Goal Setting appointment if patient doesn't complete the RN Assessment first    Pending Appointments:  11/30/17 at 1:00 with Dr. Martinez and Care Guide for Goal Setting appointment  _________________________  Possible future goals:  Long term pain management--needs pain assessed first?  Arthritis in foot--management of symptoms?    Need to notify Carmen, [facility] worker, of appointments as well  Carmen will accompany patient to appointments    Patient is a poor historian per Dr. Martinez
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
## 2021-06-16 NOTE — PROGRESS NOTES
Pt scheduled for RN assessment on 3/6/18 at 2:00 pm. Pt was a no show.  Per Dr Martinez note from 3/5/18, pt had been living out of town from 10/2017 until 1/2018. Pt has been a no show twice in late 2017. Per standard work for missing [facility] RN assessment appointments, pt would not be contacted by Clinic Care Coordination staff, if they had 3 no shows. Since she was living out of town, we will try to schedule her 1 more time.  Gary, pt's Care Guide, will contact pt to reschedule RN assessment.

## 2021-06-17 NOTE — PROGRESS NOTES
Attempt 1:  Care guide called patient to offer [facility] Enrollment.  There was no answer and the answering machine was not recording the message.  If the patient wants to speak to Care guide please transfer her to Gary Wylie at [phone].

## 2021-06-17 NOTE — PROGRESS NOTES
The Clinic Care Guide called the patient  today at the request of the PCP to discuss possible clinic care coordination enrollment. Patient declined services due to the fact that she has moved from [place].  There will be no more outreach at this time.

## 2021-06-17 NOTE — PROGRESS NOTES
Brief Diagnostic Assessment  Patient Name: Niharika Baird  Age:  37 y.o.    1980  Date: 18  Start Time: 9:45am  Stop Time: 10:45am  Referring Provider: Dr. Winnie Martinez  Persons Present: Patient and therapist  Recipient's description of symptoms (include reason for referral):  Patient was referred by PCP for evaluation of mental health symptoms for psychotherapy and patient expressed interest in referral to psychiatry as well. Patient reports she had been on psychotropic medications in the past, but has not refilled her medications since 2017, and did not feel like her medications were helping. She is interested in consulting with psychiatrist to discuss options. Patient reports struggling with anxiety and depression, related to her ongoing health issues and pain and general life stressors. Patient endorsed the following symptoms: dizziness, dry mouth, flushes/chills, sweating, chest pains, rapid heart pounding, abdominal pain, diarrhea, trembling, disturbing body sensations, constipation, nausea, blurred vision, headaches, numbness, shortness of breath, inability to sleep, decreased energy, restlessness, unstable relationships, problems with family/relatives, loss of interest in activities, decreased social activity, poor memory, poor attention, racing thoughts, recurrent bad memories, night terrors, anger, temper outbursts, irritability, anxiety/worries, excessive fears, depressed mood, emptiness/loneliness, boredom, mood swings, and feelings of guilt. Patient denied having any suicidal ideation, though she does report occasional thoughts about self harm, but denied any plans or intentions to hurt herself.  Mental Health History (include review of records, or [facility] to obtain, previous outpatient psychotherapy, hospitalizations, commitments, psychiatry, etc):  Patient reports having been diagnosed with anxiety and depression since . She reports she has had services in place in the past,  "such as a psychiatrist, mental health , and [facility] worker, but she no longer has those services anymore - it is unclear why services ended. Patient also reports a history of being hospitalized in 2014, stating that she was \"locked up in the psych jha for 72 days, not hours, but days, and I was handcuffed for the first 3 days.\" Patient reports that hospitalization happened right after her mother's death and . No other history of mental health treatment reported, no known family history of mental health reported.    Mental Status Evaluation:  Grooming: Adequate  Attire: Appropriate  Age: Appears Stated  Behavior Towards Examiner: Cooperative  Motor Activity: Within normal   Eye Contact: Appropriate - wearing sunglasses indoors  Mood: Depressed  Affect: Congruent w/content of speech  Speech/Language: Within normal  Attention: Within normal  Concentration: Within normal  Thought Process: Within normal  Thought Content: No hallucinations/delusions reported.  Orientation: X 3No Evidence of Impairment  Memory: Impairment  Judgement: No Evidence of Impairment  Estimated Intelligence: Average  Demonstrated Insight: Adequate  Fund of Knowledge: adequate  Suicidal ideation: None Reported This Session    Cultural influences and impact:  Patient is an  female who was born and raised in [facility]. Her mother was a single parent, patient did not know who her father was and he was never involved in her life. Patient was a middle child, she had an older brother and sister and younger brother and sister. Patient reports she was born prematurely and diagnosed with developmental disability. Patient's mother gave up her up for adoption to her mother's older brother when patient was young. Patient expressed some resentment regarding being placed for adoption.    CAGE-AID 0 /4    Clinical Summary:     Patient is a 37-year-old  female who presents for diagnostic assessment " for ongoing psychotherapy and referral to psychiatry for medication management. Patient was born and raised in [facility]. Her mother was a single parent, patient did not know who her father was and he was never involved in her life. Patient was a middle child, she had an older brother and sister and younger brother and sister. Patient reports she was born prematurely and diagnosed with developmental disability. Patient's mother gave up her up for adoption to her mother's older brother when patient was young. Patient expressed some resentment regarding being placed for adoption. Patient reports she went to high school and started the 12th grade but did not graduate because she went to longterm (she did not disclose details today). Patient reports she did complete her GED afterwards. Patient is on social security disability and has no formal work history. 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 Patient is , she was  in August 2010, legally  in December 2010, and the divorce was finalized in July 2014. Patient reports a history of unstable relationships, but she is currently not in a relationship. Patient has 3 children, ages 17, 13, and 12. Patient reports her 17-year-old son's father passed away a month before her son was born. Patient's 13-year-old son and 12-year-old daughter share the same father, though their father is not involved, and their father denies that the daughter is his, though he did complete a paternity test. Patient reports having a history of child protection involvement, and patient's children are currently living with her uncle and doing very well there. Patient reports being happy that her children are in a good home and she is able to maintain regular contact with them.    Patient reports she had been on psychotropic medications in the past, but has not refilled her medications  "since October 2017, and she did not feel like her medications were helping. She is interested in consulting with psychiatrist to discuss medication options. Patient also expressed interest in participating in ongoing psychotherapy. Patient reports struggling with anxiety and depression, related to her ongoing health issues and pain and general life stressors. Patient denied any history of trauma or abuse. Significant events include many deaths in life, family members and friends. Patient endorsed the following symptoms: dizziness, dry mouth, flushes/chills, sweating, chest pains, rapid heart pounding, abdominal pain, diarrhea, trembling, disturbing body sensations, constipation, nausea, blurred vision, headaches, numbness, shortness of breath, inability to sleep, decreased energy, restlessness, unstable relationships, problems with family/relatives, loss of interest in activities, decreased social activity, poor memory, poor attention, racing thoughts, recurrent bad memories, night terrors, anger, temper outbursts, irritability, anxiety/worries, excessive fears, depressed mood, emptiness/loneliness, boredom, mood swings, and feelings of guilt. Patient denied having any suicidal ideation, though she does report occasional thoughts about self harm, but denied any plans or intentions to hurt herself. Patient denied any homicidal ideation or psychotic symptoms. Patient does endorse drinking alcohol every other weekend. Patient reports she is able to use medical marijuana and has a prescription for a \"liquid weed pill,\" but reports she does not like how strong the pill is, as she had \"blacked out for 2 hours\" once, so she smokes THC daily instead. Patient also smokes 2 cigarettes per day. Based on the reported symptoms and reported problems, patient meets DSM-5 criteria for Major Depressive Disorder, recurrent, severe, and Generalized anxiety disorder. Alternative diagnoses that were ruled out due to insufficient " information at time of intake included historical diagnosis of bipolar affective disorder. Therapist will continue to assess symptoms as patient engages in psychotherapy and update DA as needed.    Recommendations (treatment, referrals, services needed):  1. Individual psychotherapy every 2 weeks  2. Follow up with referral to psychiatry for medication management  3. Increase social support network    Diagnosis (non-Axial as defined in DSM-5):  1. Severe episode of recurrent major depressive disorder, without psychotic features    2. Generalized anxiety disorder        Provisional diagnostic hypothesis:  1. Bipolar Affective Disorder  2. Posttraumatic stress disorder    WHODAS 2.0 12-item version: 29/48=60% severe level of disability, PHQ-9 score: 23 severe depression, MASSIEL-7 score: 17 severe anxiety      Therapist s Signature/Supervisor/co-signature statement:   AMIRAH Dwyer

## 2021-08-15 ENCOUNTER — HEALTH MAINTENANCE LETTER (OUTPATIENT)
Age: 41
End: 2021-08-15

## 2021-10-11 ENCOUNTER — HEALTH MAINTENANCE LETTER (OUTPATIENT)
Age: 41
End: 2021-10-11

## 2021-11-08 NOTE — PROGRESS NOTES
Patient came into the clinic today and Dr. Martinez wanted Martha castellanos to talk to the patient about enrollment.  Patient was scheduled for an RN Assessment for tomorrow at 2:00 pm with LC Griffiths.  If patient no shows this appointment outreach will stop.  Patient will have had 3 different appointments scheduled and no-showed if tomorrow is missed.  Care guide then took the patient to X-Ray, then to Specialty Schedulers.  While waiting for the schedulers she had to leave for a prior engagement.  Any questions let the care emanuel know, Gary Wylie at [phone].  
Declines

## 2022-09-25 ENCOUNTER — HEALTH MAINTENANCE LETTER (OUTPATIENT)
Age: 42
End: 2022-09-25

## 2023-10-14 ENCOUNTER — HEALTH MAINTENANCE LETTER (OUTPATIENT)
Age: 43
End: 2023-10-14

## 2024-03-02 ENCOUNTER — HEALTH MAINTENANCE LETTER (OUTPATIENT)
Age: 44
End: 2024-03-02
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
## 2024-09-05 ENCOUNTER — OFFICE VISIT (OUTPATIENT)
Dept: FAMILY MEDICINE | Facility: CLINIC | Age: 44
End: 2024-09-05
Payer: COMMERCIAL

## 2024-09-05 DIAGNOSIS — N94.6 DYSMENORRHEA: ICD-10-CM

## 2024-09-05 DIAGNOSIS — Z00.00 ROUTINE GENERAL MEDICAL EXAMINATION AT A HEALTH CARE FACILITY: Primary | ICD-10-CM

## 2024-09-05 DIAGNOSIS — R10.2 PELVIC PAIN IN FEMALE: ICD-10-CM

## 2024-09-05 PROBLEM — J45.20 MILD INTERMITTENT ASTHMA WITHOUT COMPLICATION: Status: ACTIVE | Noted: 2017-04-27

## 2024-09-05 PROBLEM — F12.90 MARIJUANA USE: Status: ACTIVE | Noted: 2017-04-27

## 2024-09-05 PROBLEM — G47.30 SLEEP APNEA: Status: ACTIVE | Noted: 2017-04-29

## 2024-09-05 PROBLEM — F17.200 NICOTINE DEPENDENCE: Status: ACTIVE | Noted: 2017-04-27

## 2024-09-05 PROBLEM — N92.6 IRREGULAR MENSES: Status: ACTIVE | Noted: 2017-04-27

## 2024-09-05 PROBLEM — M25.562 KNEE PAIN, LEFT: Status: ACTIVE | Noted: 2018-04-06

## 2024-09-05 PROBLEM — Z87.820 HISTORY OF TRAUMATIC BRAIN INJURY: Status: ACTIVE | Noted: 2018-04-06

## 2024-09-05 PROBLEM — I25.2 HISTORY OF MI (MYOCARDIAL INFARCTION): Status: ACTIVE | Noted: 2018-04-10

## 2024-09-05 PROBLEM — F41.9 ANXIETY: Status: ACTIVE | Noted: 2017-04-27

## 2024-09-05 PROBLEM — E66.811 OBESITY (BMI 30.0-34.9): Status: ACTIVE | Noted: 2017-04-27

## 2024-09-05 PROBLEM — M27.2 ABSCESS OF MANDIBLE: Status: ACTIVE | Noted: 2023-11-06

## 2024-09-05 PROBLEM — F31.32 BIPOLAR AFFECTIVE DISORDER, CURRENTLY DEPRESSED, MODERATE (H): Status: ACTIVE | Noted: 2017-05-08

## 2024-09-05 LAB
ERYTHROCYTE [DISTWIDTH] IN BLOOD BY AUTOMATED COUNT: 13.1 % (ref 10–15)
HCG UR QL: NEGATIVE
HCT VFR BLD AUTO: 37 % (ref 35–47)
HGB BLD-MCNC: 12 G/DL (ref 11.7–15.7)
MCH RBC QN AUTO: 30.4 PG (ref 26.5–33)
MCHC RBC AUTO-ENTMCNC: 32.4 G/DL (ref 31.5–36.5)
MCV RBC AUTO: 94 FL (ref 78–100)
PLATELET # BLD AUTO: 256 10E3/UL (ref 150–450)
RBC # BLD AUTO: 3.95 10E6/UL (ref 3.8–5.2)
WBC # BLD AUTO: 9.7 10E3/UL (ref 4–11)

## 2024-09-05 PROCEDURE — 36415 COLL VENOUS BLD VENIPUNCTURE: CPT

## 2024-09-05 PROCEDURE — 99214 OFFICE O/P EST MOD 30 MIN: CPT | Mod: 25

## 2024-09-05 PROCEDURE — 96372 THER/PROPH/DIAG INJ SC/IM: CPT

## 2024-09-05 PROCEDURE — 81025 URINE PREGNANCY TEST: CPT

## 2024-09-05 PROCEDURE — 99386 PREV VISIT NEW AGE 40-64: CPT | Mod: 25

## 2024-09-05 PROCEDURE — 85027 COMPLETE CBC AUTOMATED: CPT

## 2024-09-05 RX ORDER — CITALOPRAM HYDROBROMIDE 20 MG/1
30 TABLET ORAL DAILY
COMMUNITY

## 2024-09-05 RX ORDER — RISPERIDONE 1 MG/1
TABLET ORAL 2 TIMES DAILY
COMMUNITY
End: 2024-09-05

## 2024-09-05 RX ORDER — ONDANSETRON 4 MG/1
4 TABLET, ORALLY DISINTEGRATING ORAL EVERY 8 HOURS PRN
Qty: 30 TABLET | Refills: 1 | Status: SHIPPED | OUTPATIENT
Start: 2024-09-05

## 2024-09-05 RX ORDER — KETOROLAC TROMETHAMINE 30 MG/ML
30 INJECTION, SOLUTION INTRAMUSCULAR; INTRAVENOUS ONCE
Status: COMPLETED | OUTPATIENT
Start: 2024-09-05 | End: 2024-09-05

## 2024-09-05 RX ORDER — NAPROXEN 500 MG/1
500 TABLET ORAL 2 TIMES DAILY WITH MEALS
Qty: 30 TABLET | Refills: 0 | Status: SHIPPED | OUTPATIENT
Start: 2024-09-05

## 2024-09-05 RX ORDER — CITALOPRAM HYDROBROMIDE 10 MG/1
TABLET ORAL DAILY
COMMUNITY

## 2024-09-05 RX ADMIN — KETOROLAC TROMETHAMINE 30 MG: 30 INJECTION, SOLUTION INTRAMUSCULAR; INTRAVENOUS at 15:58

## 2024-09-05 ASSESSMENT — PATIENT HEALTH QUESTIONNAIRE - PHQ9
SUM OF ALL RESPONSES TO PHQ QUESTIONS 1-9: 12
SUM OF ALL RESPONSES TO PHQ QUESTIONS 1-9: 12
10. IF YOU CHECKED OFF ANY PROBLEMS, HOW DIFFICULT HAVE THESE PROBLEMS MADE IT FOR YOU TO DO YOUR WORK, TAKE CARE OF THINGS AT HOME, OR GET ALONG WITH OTHER PEOPLE: VERY DIFFICULT

## 2024-09-05 NOTE — PROGRESS NOTES
Preceptor Attestation:  Patient's case reviewed and discussed with Ricki Zendejas MD resident and I evaluated the patient. I agree with written assessment and plan of care.  Supervising Physician:  AMELIA HANSON MD  [facility]

## 2024-09-05 NOTE — PATIENT INSTRUCTIONS
Patient Education   Preventive Care Advice   This is general advice given by our system to help you stay healthy. However, your care team may have specific advice just for you. Please talk to your care team about your preventive care needs.  Nutrition  Eat 5 or more servings of fruits and vegetables each day.  Try wheat bread, brown rice and whole grain pasta (instead of white bread, rice, and pasta).  Get enough calcium and vitamin D. Check the label on foods and aim for 100% of the RDA (recommended daily allowance).  Lifestyle  Exercise at least 150 minutes each week  (30 minutes a day, 5 days a week).  Do muscle strengthening activities 2 days a week. These help control your weight and prevent disease.  No smoking.  Wear sunscreen to prevent skin cancer.  Have a dental exam and cleaning every 6 months.  Yearly exams  See your health care team every year to talk about:  Any changes in your health.  Any medicines your care team has prescribed.  Preventive care, family planning, and ways to prevent chronic diseases.  Shots (vaccines)   HPV shots (up to age 26), if you've never had them before.  Hepatitis B shots (up to age 59), if you've never had them before.  COVID-19 shot: Get this shot when it's due.  Flu shot: Get a flu shot every year.  Tetanus shot: Get a tetanus shot every 10 years.  Pneumococcal, hepatitis A, and RSV shots: Ask your care team if you need these based on your risk.  Shingles shot (for age 50 and up)  General health tests  Diabetes screening:  Starting at age 35, Get screened for diabetes at least every 3 years.  If you are younger than age 35, ask your care team if you should be screened for diabetes.  Cholesterol test: At age 39, start having a cholesterol test every 5 years, or more often if advised.  Bone density scan (DEXA): At age 50, ask your care team if you should have this scan for osteoporosis (brittle bones).  Hepatitis C: Get tested at least once in your life.  STIs (sexually  transmitted infections)  Before age 24: Ask your care team if you should be screened for STIs.  After age 24: Get screened for STIs if you're at risk. You are at risk for STIs (including HIV) if:  You are sexually active with more than one person.  You don't use condoms every time.  You or a partner was diagnosed with a sexually transmitted infection.  If you are at risk for HIV, ask about PrEP medicine to prevent HIV.  Get tested for HIV at least once in your life, whether you are at risk for HIV or not.  Cancer screening tests  Cervical cancer screening: If you have a cervix, begin getting regular cervical cancer screening tests starting at age 21.  Breast cancer scan (mammogram): If you've ever had breasts, begin having regular mammograms starting at age 40. This is a scan to check for breast cancer.  Colon cancer screening: It is important to start screening for colon cancer at age 45.  Have a colonoscopy test every 10 years (or more often if you're at risk) Or, ask your provider about stool tests like a FIT test every year or Cologuard test every 3 years.  To learn more about your testing options, visit:   .  For help making a decision, visit:   https://bit.ly/mw25242.  Prostate cancer screening test: If you have a prostate, ask your care team if a prostate cancer screening test (PSA) at age 55 is right for you.  Lung cancer screening: If you are a current or former smoker ages 50 to 80, ask your care team if ongoing lung cancer screenings are right for you.  For informational purposes only. Not to replace the advice of your health care provider. Copyright   2023 [employer]. All rights reserved. Clinically reviewed by the [facility]. [employer] 630967 - REV 01/24.
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
## 2024-09-16 RX ORDER — GABAPENTIN 100 MG/1
CAPSULE ORAL
COMMUNITY
Start: 2023-04-24

## 2025-03-17 ENCOUNTER — PATIENT OUTREACH (OUTPATIENT)
Dept: CARE COORDINATION | Facility: CLINIC | Age: 45
End: 2025-03-17
Payer: COMMERCIAL

## 2025-03-18 ENCOUNTER — TELEPHONE (OUTPATIENT)
Dept: INTERNAL MEDICINE | Facility: CLINIC | Age: 45
End: 2025-03-18
Payer: COMMERCIAL

## 2025-03-18 ENCOUNTER — PATIENT OUTREACH (OUTPATIENT)
Dept: CARE COORDINATION | Facility: CLINIC | Age: 45
End: 2025-03-18
Payer: COMMERCIAL

## 2025-03-18 DIAGNOSIS — D50.8 OTHER IRON DEFICIENCY ANEMIA: Primary | ICD-10-CM

## 2025-03-18 DIAGNOSIS — E55.9 VITAMIN D DEFICIENCY: ICD-10-CM

## 2025-03-18 RX ORDER — IRON POLYSACCH/IRON HEME POLYP 28 MG
1 TABLET ORAL DAILY
Qty: 90 TABLET | Refills: 3 | Status: SHIPPED | OUTPATIENT
Start: 2025-03-18

## 2025-03-18 RX ORDER — CHOLECALCIFEROL (VITAMIN D3) 50 MCG
1 TABLET ORAL DAILY
Qty: 90 TABLET | Refills: 3 | Status: SHIPPED | OUTPATIENT
Start: 2025-03-18

## 2025-03-18 NOTE — PROGRESS NOTES
Clinic Care Coordination Contact  [facility]/Voicemail    Clinical Data: Care Coordinator Outreach    Outreach Documentation Number of Outreach Attempt   3/18/2025   3:38 PM 1       Left message on patient's voicemail with call back information and requested return call.      Plan: Care Coordinator CHW to discuss recent CC referral with patient  and offer CC services & resources.  Care Coordinator will try to reach patient again in 1-2 business days.  Comments    Transportation, food, housing,            Order Questions    Question Answer   Reason for Referral: SDOH Concern    Patient/Caregiver Support   Patient/Caregiver Suport: Resources for Support   Clinical Staff have discussed the Care Coordination Referral with the patient and/or caregiver: Yes     Tere VITALE  Community Health Worker  [facility] Care Coordination  Todd Alvarado [email or web address] [email or web address]  [email or web address] [email or web address]  Office: [phone]

## 2025-03-19 ENCOUNTER — PATIENT OUTREACH (OUTPATIENT)
Dept: CARE COORDINATION | Facility: CLINIC | Age: 45
End: 2025-03-19
Payer: COMMERCIAL

## 2025-03-19 NOTE — PROGRESS NOTES
Clinic Care Coordination Contact  Community Health Worker Initial Outreach    CHW Initial Information Gathering:  Referral Source: PCP  Preferred Urgent Care: [facility] [place], [phone]  Current living arrangement:: I live in a private home with family  Type of residence:: Apartment  Transportation means:: Friend, Family, Other  CHW Additional Questions  If ED/Hospital discharge, follow-up appointment scheduled as recommended?: N/A  Medication changes made following ED/Hospital discharge?: N/A  MyChart active?: Yes  Patient sent Social Drivers of Health questionnaire?: Yes    Patient accepts CC: Yes. Patient scheduled for assessment with CC NHUNG Barreto on Monday 3/24/2025 at 2:00 pm. Patient noted desire to discuss recent CC/SDOH referral.     Comments     Transportation, food, housing,              Order Questions     Question Answer   Reason for Referral: SDOH Concern     Patient/Caregiver Support   Patient/Caregiver Suport: Resources for Support   Clinical Staff have discussed the Care Coordination Referral with the patient and/or caregiver: Yes      Tere VITALE  Community Health Worker  [facility] Care Coordination  [email or web address]  Office: [phone]

## 2025-03-19 NOTE — TELEPHONE ENCOUNTER
Spoke with patient who verbalizes understanding. Patient agreeable to do colonoscopy. Referral pending

## 2025-03-19 NOTE — TELEPHONE ENCOUNTER
Please let pt know lab results:    Hemoglobin level and iron level is slightly below normal indicating mild anemia and iron deficiency. This can be from menstrual loses, but I strongly encourage her to have colonoscopy dose as we discussed (referral placed).    Meanwhile she can start on Iron supplement: feosol Bifera daily to help improve iron and anemia. It can make her stool look dart and may cause slightly constipation. Rx sent.  Please make sure to see Gyn due to irregular heavy menses.    Vitamin D level is low, start on vit D supplement 2,000 units daily. Rx sent.    Her kidney, liver, thyroid functions, sugar and cholesterol levels are all good.    DR EDOUARD

## 2025-03-24 ENCOUNTER — PATIENT OUTREACH (OUTPATIENT)
Dept: NURSING | Facility: CLINIC | Age: 45
End: 2025-03-24
Payer: COMMERCIAL

## 2025-03-24 NOTE — LETTER
[facility] CARE COORDINATION  [facility]    March 24, 2025    Niharika BARRETT Baird  [address]  [place] [address]      Dear Niharika,    I am a clinic care coordinator who works with Courtney Jennings MD with the [facility]. I wanted to thank you for spending the time to talk with me.  Below is a description of clinic care coordination and how I can further assist you.       The clinic care coordination team is made up of a registered nurse, , financial resource worker and community health worker who understand the health care system. The goal of clinic care coordination is to help you manage your health and improve access to the health care system. Our team works alongside your provider to assist you in determining your health and social needs. We can help you obtain health care and community resources, providing you with necessary information and education. We can work with you through any barriers and develop a care plan that helps coordinate and strengthen the communication between you and your care team.  Our services are voluntary and are offered without charge to you personally.    Please feel free to contact me with any questions or concerns regarding care coordination and what we can offer.      We are focused on providing you with the highest-quality healthcare experience possible.    Sincerely,     Mary Lou Michel,   [facility]  [phone]      Enclosed: Jaciel Bundy, take a look at the options and let me know if you have any questions.  I completed the signup for Fare for All and the Mobile bus, so you can utilize in March if you find an event that works for you.     Today's [place] - A Fresh, Free Market  this is the free store.      Schedule for Mobile bus  [facility] Schedule - [employer]  if you use this option, you can go weekly and spend up to $20 each week for $80 total for the month. You can pick out the groceries you  want.     Fare For All Schedule - [employer] here is the schedule for the once of month pick ups and you would get food in a box already selected.  $80 per month free    Fresh Produce Events - [facility]  this is the once a month produce event. Bring your own bags.

## 2025-03-24 NOTE — PROGRESS NOTES
Contact   Chart Review     Situation: Patient chart reviewed by .    Background: assessment with NHUNG DAVIDSON to discuss resources.    Assessment: patient lives with her 18 yr old daughter in subsidized housing.  She has a cadi waiver and gets Mom's Meals, homemaking and PCA which her brother does.  They have struggled trying to get her daughter on her benefits for [facility].  They finished in late February.  She will go back to downw office soon if they don't get update soon.  They are also working on getting daughter's Social Security payments to go to her or to mom.  Daughter lived with her auntie until 3 years ago when patient got custody back. Patient is on Social Security disability of less than $900 per month, and gets $81 in SNAP.  Her daughter wants to go to massage school.  Patient is savvy with trying to work with Social Security and the [facility].  She also has a coordinator at her building who can assist.  Her daughter does not eat meat.  Finding it difficult to get enough food.  Patient does not drive.  Asked about Cars through [employer], but she does not meet the criteria.      Did review of food resources and she is very interested. Signed her up for Fare for All.  Sent via email the resources for her to review.  Also sent on My Chart per her request.  Writer communicated the risks of unencrypted electronic communication and the patient and/or patient representative has agreed to accept the risks and receive unencrypted communication related to the information or resources we have discussed. We reviewed that no PHI will be included.  Email address verified with the patient.  Will include electronic communication form for patient/caregiver to complete.   Jaciel Bundy, take a look at the options and let me know if you have any questions.  I completed the signup for Fare for All and the Mobile bus, so you can utilize in March if you find an event that works for you.      Today's [place] - A Fresh, Free Market  this is the free store.      Schedule for Mobile bus  [facility] Schedule - [employer]  if you use this option, you can go weekly and spend up to $20 each week for $80 total for the month. You can pick out the groceries you want.     Fare For All Schedule - [employer] here is the schedule for the once of month pick ups and you would get food in a box already selected.  $80 per month free    Fresh Produce Events - [facility]  this is the once a month produce event. Bring your own bags.        Plan/Recommendations: as patient has support from [employer] Waiver services, will not enroll in care coordination.  She will call if she has any other questions or concerns.     Mary Lou Michel,   [facility]  [phone]

## 2025-04-01 ENCOUNTER — VIRTUAL VISIT (OUTPATIENT)
Dept: PSYCHIATRY | Facility: CLINIC | Age: 45
End: 2025-04-01
Payer: COMMERCIAL

## 2025-04-01 DIAGNOSIS — F32.9 REACTIVE DEPRESSION: ICD-10-CM

## 2025-04-01 DIAGNOSIS — F31.32 BIPOLAR AFFECTIVE DISORDER, CURRENTLY DEPRESSED, MODERATE (H): ICD-10-CM

## 2025-04-01 ASSESSMENT — ANXIETY QUESTIONNAIRES
5. BEING SO RESTLESS THAT IT IS HARD TO SIT STILL: NEARLY EVERY DAY
3. WORRYING TOO MUCH ABOUT DIFFERENT THINGS: NEARLY EVERY DAY
7. FEELING AFRAID AS IF SOMETHING AWFUL MIGHT HAPPEN: NEARLY EVERY DAY
GAD7 TOTAL SCORE: 21
8. IF YOU CHECKED OFF ANY PROBLEMS, HOW DIFFICULT HAVE THESE MADE IT FOR YOU TO DO YOUR WORK, TAKE CARE OF THINGS AT HOME, OR GET ALONG WITH OTHER PEOPLE?: VERY DIFFICULT
6. BECOMING EASILY ANNOYED OR IRRITABLE: NEARLY EVERY DAY
7. FEELING AFRAID AS IF SOMETHING AWFUL MIGHT HAPPEN: NEARLY EVERY DAY
GAD7 TOTAL SCORE: 21
GAD7 TOTAL SCORE: 21
IF YOU CHECKED OFF ANY PROBLEMS ON THIS QUESTIONNAIRE, HOW DIFFICULT HAVE THESE PROBLEMS MADE IT FOR YOU TO DO YOUR WORK, TAKE CARE OF THINGS AT HOME, OR GET ALONG WITH OTHER PEOPLE: VERY DIFFICULT
1. FEELING NERVOUS, ANXIOUS, OR ON EDGE: NEARLY EVERY DAY
2. NOT BEING ABLE TO STOP OR CONTROL WORRYING: NEARLY EVERY DAY
4. TROUBLE RELAXING: NEARLY EVERY DAY

## 2025-04-01 ASSESSMENT — PATIENT HEALTH QUESTIONNAIRE - PHQ9
10. IF YOU CHECKED OFF ANY PROBLEMS, HOW DIFFICULT HAVE THESE PROBLEMS MADE IT FOR YOU TO DO YOUR WORK, TAKE CARE OF THINGS AT HOME, OR GET ALONG WITH OTHER PEOPLE: VERY DIFFICULT
SUM OF ALL RESPONSES TO PHQ QUESTIONS 1-9: 18
SUM OF ALL RESPONSES TO PHQ QUESTIONS 1-9: 18

## 2025-04-01 ASSESSMENT — PAIN SCALES - GENERAL: PAINLEVEL_OUTOF10: SEVERE PAIN (10)

## 2025-04-01 NOTE — NURSING NOTE
Is the patient currently in the state of [place]? YES    Current patient location: Patient declined to provide     Visit mode:Video    If the visit is dropped, the patient can be reconnected by: VIDEO VISIT: Text to cell phone:   Telephone Information:   Mobile [phone]       Will anyone else be joining the visit? No  (If patient encounters technical issues they should call [phone])    Are changes needed to the allergy or medication list? No    Are refills needed on medications prescribed by this physician? No    Rooming Documentation: Questionnaire(s) completed.    Reason for visit: Consult     Monica Acevedo [facility]      Care team has reviewed attendance agreement with patient. Patient advised that two failed appointments within 6 months may lead to termination of current episode of care.

## 2025-04-01 NOTE — PROGRESS NOTES
Virtual Visit Details    Patient left   Answers submitted by the patient for this visit:  Patient Health Questionnaire (Submitted on 4/1/2025)  If you checked off any problems, how difficult have these problems made it for you to do your work, take care of things at home, or get along with other people?: Very difficult  PHQ9 TOTAL SCORE: 18  Patient Health Questionnaire (G7) (Submitted on 4/1/2025)  MASSIEL 7 TOTAL SCORE: 21

## 2025-04-01 NOTE — PROGRESS NOTES
I saw patient briefly for intake appointment. There was a loud background noise that made it difficult for the writer hear patient well. Patient tried to move to several different locations in her house with no improvement as writer can hardly hear patient due to several people talking in the background. Patient's video got disconnected after 20 minutes. Writer called patient back twice and lvm for patient to rejoin the video with no response. Writer then called the [facility] and informed them to reschedule the intake appointment as in-person visit.   Answers submitted by the patient for this visit:  Patient Health Questionnaire (Submitted on 4/1/2025)  If you checked off any problems, how difficult have these problems made it for you to do your work, take care of things at home, or get along with other people?: Very difficult  PHQ9 TOTAL SCORE: 18  Patient Health Questionnaire (G7) (Submitted on 4/1/2025)  MASSIEL 7 TOTAL SCORE: 21

## 2025-04-07 ENCOUNTER — HOSPITAL ENCOUNTER (OUTPATIENT)
Dept: CT IMAGING | Facility: HOSPITAL | Age: 45
Discharge: HOME | End: 2025-04-07
Attending: INTERNAL MEDICINE
Payer: COMMERCIAL

## 2025-04-07 ENCOUNTER — ANCILLARY PROCEDURE (OUTPATIENT)
Dept: MAMMOGRAPHY | Facility: HOSPITAL | Age: 45
End: 2025-04-07
Attending: INTERNAL MEDICINE
Payer: COMMERCIAL

## 2025-04-07 DIAGNOSIS — Z12.31 VISIT FOR SCREENING MAMMOGRAM: ICD-10-CM

## 2025-04-07 DIAGNOSIS — R10.32 ABDOMINAL PAIN, LEFT LOWER QUADRANT: ICD-10-CM

## 2025-04-07 PROCEDURE — 77063 BREAST TOMOSYNTHESIS BI: CPT

## 2025-04-07 PROCEDURE — 74176 CT ABD & PELVIS W/O CONTRAST: CPT

## 2025-05-12 ENCOUNTER — TELEPHONE (OUTPATIENT)
Dept: FAMILY MEDICINE | Facility: CLINIC | Age: 45
End: 2025-05-12

## 2025-05-12 NOTE — TELEPHONE ENCOUNTER
Patient was contacted per. Provider's request to come in early for appointment.   Patient stated that does not feel well and would like to reschedule appointment. Appointment has been rescheduled.     CARMEN Groves   [facility] Center[facility]IL[facility]

## 2025-05-15 ENCOUNTER — VIRTUAL VISIT (OUTPATIENT)
Dept: INTERNAL MEDICINE | Facility: CLINIC | Age: 45
End: 2025-05-15
Payer: COMMERCIAL

## 2025-05-15 DIAGNOSIS — Z11.3 SCREENING EXAMINATION FOR STI: ICD-10-CM

## 2025-05-15 DIAGNOSIS — F31.32 BIPOLAR AFFECTIVE DISORDER, CURRENTLY DEPRESSED, MODERATE (H): ICD-10-CM

## 2025-05-15 DIAGNOSIS — N92.0 MENORRHAGIA WITH REGULAR CYCLE: Primary | ICD-10-CM

## 2025-05-15 DIAGNOSIS — K80.20 GALL STONES: ICD-10-CM

## 2025-05-15 ASSESSMENT — PATIENT HEALTH QUESTIONNAIRE - PHQ9
SUM OF ALL RESPONSES TO PHQ QUESTIONS 1-9: 2
10. IF YOU CHECKED OFF ANY PROBLEMS, HOW DIFFICULT HAVE THESE PROBLEMS MADE IT FOR YOU TO DO YOUR WORK, TAKE CARE OF THINGS AT HOME, OR GET ALONG WITH OTHER PEOPLE: NOT DIFFICULT AT ALL
SUM OF ALL RESPONSES TO PHQ QUESTIONS 1-9: 2

## 2025-05-15 ASSESSMENT — ASTHMA QUESTIONNAIRES
QUESTION_1 LAST FOUR WEEKS HOW MUCH OF THE TIME DID YOUR ASTHMA KEEP YOU FROM GETTING AS MUCH DONE AT WORK, SCHOOL OR AT HOME: NONE OF THE TIME
QUESTION_2 LAST FOUR WEEKS HOW OFTEN HAVE YOU HAD SHORTNESS OF BREATH: NOT AT ALL
QUESTION_3 LAST FOUR WEEKS HOW OFTEN DID YOUR ASTHMA SYMPTOMS (WHEEZING, COUGHING, SHORTNESS OF BREATH, CHEST TIGHTNESS OR PAIN) WAKE YOU UP AT NIGHT OR EARLIER THAN USUAL IN THE MORNING: NOT AT ALL
ACT_TOTALSCORE: 25
QUESTION_4 LAST FOUR WEEKS HOW OFTEN HAVE YOU USED YOUR RESCUE INHALER OR NEBULIZER MEDICATION (SUCH AS ALBUTEROL): NOT AT ALL
QUESTION_5 LAST FOUR WEEKS HOW WOULD YOU RATE YOUR ASTHMA CONTROL: COMPLETELY CONTROLLED

## 2025-05-15 NOTE — PROGRESS NOTES
"Niharika is a 44 year old who is being evaluated via a billable video visit.    How would you like to obtain your AVS? MyChart  If the video visit is dropped, the invitation should be resent by: Send to e-mail at: [email or web address]  Will anyone else be joining your video visit? No      Assessment & Plan     Menorrhagia with regular cycle  Mild iron deficiency anemia, will continue on iron supplement and repeat CBC  - CBC with platelets and differential; Future  - Iron and iron binding capacity; Future    Screening examination for STI  Frequent pelvic pressure, vaginal discomfort.  Will repeat STI screening.  Previously referral to GYN was placed, she needs to reschedule  - NEISSERIA GONORRHOEA PCR; Future  - CHLAMYDIA TRACHOMATIS PCR; Future  - Hepatitis C antibody; Future  - HIV Antigen Antibody Combo [place]; Future  - UA Macroscopic with reflex to Microscopic and Culture; Future  - Treponema Abs w Reflex to RPR and Titer; Future    Gall stones  Noted incidentally on CT scan for lower abdominal pain, however patient does report upper abdominal pain on the right side, referral to general surgery provided.  - Adult Gen Surg  Referral; Future    Bipolar affective disorder, currently depressed, moderate (H)  She is doing slightly better mental health wise on 20 mg of selected risperidone but still has significant depressive symptoms.  In April intake was done by a male provider and psychologically she is unable to communicate well with males and would prefer a female provider.  Will put a new referral in.  While talking to her today she does seem to be easily triggered and tearful when we talk about gallbladder surgery.  - Adult Mental Health  Referral; Future    BMI  Estimated body mass index is 32.67 kg/m  as calculated from the following:    Height as of 3/14/25: 1.588 m (5' 2.5\").    Weight as of 3/14/25: 82.3 kg (181 lb 8 oz).       Subjective   Niharika is a 44 year old, presenting for the " following health issues:  Results (Discuss recent lab results from 3/18 ) and Vaginal Problem (Patient reports vaginal/skin irritation x several days - she is looking to complete a UA test for any infection )      5/15/2025    12:46 PM   Additional Questions   Roomed by Erin GAR   Accompanied by alone         5/15/2025    12:46 PM   Patient Reported Additional Medications   Patient reports taking the following new medications none       Vaginal Problem     History of Present Illness       Reason for visit:  Lab results follow up    She eats 4 or more servings of fruits and vegetables daily.She consumes 1 sweetened beverage(s) daily.She exercises with enough effort to increase her heart rate 20 to 29 minutes per day.  She exercises with enough effort to increase her heart rate 7 days per week.   She is taking medications regularly.      Niharika is a 44-year-old female with history of smoking, dysmenorrhea and pelvic pain, 3  and tubal ligation in , prior history of chlamydia, history of anxiety depression and borderline personality, mild to moderate facet arthropathy, cannabis dependence.  She is currently here for a telephone visit to follow-up on recent results.    In March she does have mild anemia and iron deficiency.  She has regular periods but they are heavy frequently she has clots.  She has started taking iron since then.  Due to vitamin deficiency she is currently taking vitamin D supplement.      She is wondering if she needs to have a Pap smear done, chart was reviewed and she had normal Pap and negative HPV in , 5-year follow-up was recommended since she has never had an abnormal.    Because of the lower abdominal pain she did have CT of the abdomen done which showed physiologic ovarian cyst at 1.8 cm.  It also showed gallstones.  She reports that she does frequently has pain on the right upper quadrant as well.  Discussed referral to surgery.    Mood has been slightly better on Celexa  20 mg a day.  She did have an intake in April but because it was done by a male provider she did not feel comfortable to continue.  Will put a new referral in, she prefers female providers only.  She has been able to sleep through the night.      Objective           Vitals:  No vitals were obtained today due to virtual visit.    Physical Exam   GENERAL: alert and no distress  EYES: Eyes grossly normal to inspection.  No discharge or erythema, or obvious scleral/conjunctival abnormalities.  RESP: No audible wheeze, cough, or visible cyanosis.    SKIN: Visible skin clear. No significant rash, abnormal pigmentation or lesions.  NEURO: Cranial nerves grossly intact.  Mentation and speech appropriate for age.  PSYCH: Appropriate affect, tone, and pace of words, no flight of ideas or pressured speech.  She was slightly tearful when I mentioned doing laparoscopic cholecystectomy for gallbladder disease.        Video-Visit Details    Type of service:  Video Visit   Telephone visit start time 5:21 PM  Telephone visit end time: 5:41 PM  Originating Location (pt. Location): Home    Distant Location (provider location):  On-site  Platform used for Video Visit: Telephone  Signed Electronically by: Amalia Gonzalez MD

## 2025-05-19 ENCOUNTER — PATIENT OUTREACH (OUTPATIENT)
Dept: CARE COORDINATION | Facility: CLINIC | Age: 45
End: 2025-05-19
Payer: COMMERCIAL

## 2025-05-29 ENCOUNTER — MYC MEDICAL ADVICE (OUTPATIENT)
Dept: SURGERY | Facility: CLINIC | Age: 45
End: 2025-05-29

## 2025-07-30 ENCOUNTER — TELEPHONE (OUTPATIENT)
Dept: FAMILY MEDICINE | Facility: CLINIC | Age: 45
End: 2025-07-30
Payer: COMMERCIAL

## 2025-07-30 NOTE — TELEPHONE ENCOUNTER
Patient Quality Outreach    Patient is due for the following:   Depression  -  PHQ-9 needed and Depression follow-up visit  Also Hospital follow up/ [facility] in May 2025    Action(s) Taken:   If patient calls back, schedule a office visit for Mood and hospital follow up and Optomal Vascular care.  3 different appointment.     Type of outreach:    Phone, left message for patient/parent to call back.    Questions for provider review:    None         Tisha Romero  Chart routed to None.